# Patient Record
Sex: FEMALE | Race: WHITE | NOT HISPANIC OR LATINO | URBAN - METROPOLITAN AREA
[De-identification: names, ages, dates, MRNs, and addresses within clinical notes are randomized per-mention and may not be internally consistent; named-entity substitution may affect disease eponyms.]

---

## 2023-12-07 ENCOUNTER — COMPREHENSIVE EXAM (OUTPATIENT)
Facility: LOCATION | Age: 83
End: 2023-12-07

## 2023-12-07 DIAGNOSIS — H52.03: ICD-10-CM

## 2023-12-07 DIAGNOSIS — H52.223: ICD-10-CM

## 2023-12-07 DIAGNOSIS — H52.4: ICD-10-CM

## 2023-12-07 PROCEDURE — 92004 COMPRE OPH EXAM NEW PT 1/>: CPT

## 2023-12-07 PROCEDURE — 92015 DETERMINE REFRACTIVE STATE: CPT

## 2023-12-07 ASSESSMENT — VISUAL ACUITY
OU_CC: 20/25
OU_CC: 20/20
OS_CC: 20/20
OS_CC: 20/30
OD_CC: 20/25
OD_CC: 20/25-1

## 2023-12-07 ASSESSMENT — TONOMETRY
OS_IOP_MMHG: 11
OD_IOP_MMHG: 11

## 2024-09-03 ENCOUNTER — CONTACT LENSES/GLASSES VISIT (OUTPATIENT)
Facility: LOCATION | Age: 84
End: 2024-09-03

## 2024-09-03 DIAGNOSIS — Z96.1: ICD-10-CM

## 2024-09-03 DIAGNOSIS — H04.123: ICD-10-CM

## 2024-09-03 DIAGNOSIS — H18.513: ICD-10-CM

## 2024-09-03 DIAGNOSIS — H53.8: ICD-10-CM

## 2024-09-03 PROCEDURE — 99213 OFFICE O/P EST LOW 20 MIN: CPT

## 2024-09-27 ENCOUNTER — HOSPITAL ENCOUNTER (OUTPATIENT)
Facility: HOSPITAL | Age: 84
Setting detail: RECURRING SERIES
Discharge: HOME | End: 2024-09-30
Payer: MEDICARE

## 2024-09-27 PROCEDURE — 97162 PT EVAL MOD COMPLEX 30 MIN: CPT

## 2024-09-27 NOTE — THERAPY EVALUATION
[facility]  [address], [address], [place] [address] Ph:[phone] Fx: [phone]  Plan of Care / Statement of Necessity for Physical Therapy Services     Patient Name: Sylvie Leblanc : 1940   Medical   Diagnosis: Pain in left knee [M25.562] Treatment Diagnosis: M25.562  LEFT KNEE PAIN        Onset Date: DOS: 24     Referral Source: Martinez Hinkle MD Start of Care (SOC): 2024   Prior Hospitalization: See medical history Provider #: [id number]   Prior Level of Function: Chronic L knee pain with ambulation and activities   Comorbidities: Musculoskeletal disorders, Social determinants of health: Depression, and Other: scoliosis, arthritis, latex allergy     Assessment / key information: Sylvie Leblanc is a 84 y.o. female with Dx: L knee pain s/p L TKA on 24. Notes pain for at least 2.5 years prior to the surgery. Reports she has HH for 2 weeks and was DC. Using a walker for ambulation but would like to walk independently. Notes difficulty with prolonged walking, stairs, and transfers. Is taking oxycodone sometimes at night if the pain is really bad, but tries to take tylenol throughout the day. Reports 2 falls in the past year. Pt lives with her son off and on in a 2 story house, however pt stays downstairs; does have 3 ALLY. Notes R knee pain as well, but isn't interested in another surgery yet. Pt rates pain as 10/10 max, 3/10 at best, 5/10 currently.    Objective:   Gait: fwd trunk with 2WW, lack of L knee motion and decreased stance time on L.   Palpation TTP with patellar mobs and popliteal space.   Knee ROM Flexion R 126 L 86, Extension R 0 L 0.   Strength: Hip Flexion R 4/5 L 4/5, ABD R 3-/5 L 2/5, Extension R NT L NT, Knee Flexion R 4+/5 L 3/5, Extension R 4+/5 L 4-/5.   Current deficits include decreased ROM and strength in L knee affecting gait, stair negotiation and transfers secondary to L TKA.  Pt will benefit from a

## 2024-09-27 NOTE — PROGRESS NOTES
PHYSICAL / OCCUPATIONAL THERAPY - DAILY TREATMENT NOTE (updated )  For Eval visit    Patient Name: Sylvie Leblanc    Date: 2024    : 1940  Insurance: Payor: JILLIAN MEDICARE / [facility] / Product Type: *No Product type* /      Patient  verified yes     Visit #   Current / Total 1 24   Time   In / Out 10:30 11:00   Pain   In / Out 5 7   Subjective Functional Status/Changes: See POC     TREATMENT AREA =  Pain in left knee [M25.562]    OBJECTIVE           30 min   Eval - untimed                      Therapeutic Procedures:  Tx Min Billable or 1:1 Min (if diff from Tx Min) Procedure, Rationale, Specifics     55233 Therapeutic Exercise (timed):  increase ROM, strength, coordination, balance, and proprioception to improve patient's ability to progress to PLOF and address remaining functional goals. (see flow sheet as applicable)     Details if applicable:       80718 Neuromuscular Re-Education (timed):  improve balance, coordination, kinesthetic sense, posture, core stability and proprioception to improve patient's ability to develop conscious control of individual muscles and awareness of position of extremities in order to progress to PLOF and address remaining functional goals. (see flow sheet as applicable)     Details if applicable:       31857 Manual Therapy (timed):  decrease pain, increase ROM, increase tissue extensibility, and decrease trigger points to improve patient's ability to progress to PLOF and address remaining functional goals.  The manual therapy interventions were performed at a separate and distinct time from the therapeutic activities interventions . (see flow sheet as applicable)     Details if applicable:       91196 Self Care/Home Management (timed):  improve patient knowledge and understanding of pain reducing techniques, positioning, posture/ergonomics, home safety, activity modification, diagnosis/prognosis, and physical therapy expectations,

## 2024-09-30 ENCOUNTER — HOSPITAL ENCOUNTER (OUTPATIENT)
Facility: HOSPITAL | Age: 84
Setting detail: RECURRING SERIES
Discharge: HOME | End: 2024-10-03
Payer: MEDICARE

## 2024-09-30 PROCEDURE — 97140 MANUAL THERAPY 1/> REGIONS: CPT

## 2024-09-30 PROCEDURE — 97110 THERAPEUTIC EXERCISES: CPT

## 2024-09-30 PROCEDURE — 97112 NEUROMUSCULAR REEDUCATION: CPT

## 2024-09-30 NOTE — PROGRESS NOTES
PHYSICAL / OCCUPATIONAL THERAPY - DAILY TREATMENT NOTE (updated )    Patient Name: Sylvie Leblanc    Date: 2024    : 1940  Insurance: Payor: JILLIAN MEDICARE / Plan: JILLIAN MEDICARE VALUE HMO / Product Type: *No Product type* /      Patient  verified Yes     Visit #   Current / Total 2 24   Time   In / Out 11:40 12:30   Pain   In / Out 5/10 5/10   Subjective Functional Status/Changes: Been doing her HEP. Wondering is she is doing well this far out from surgery.      TREATMENT AREA =  Pain in left knee [M25.562]    OBJECTIVE    Modalities Rationale:     decrease edema, decrease inflammation, and decrease pain to improve patient's ability to progress to PLOF and address remaining functional goals.     min [] Estim Unattended, type/location:                                      []  w/ice    []  w/heat    min [] Estim Attended, type/location:                                     []  w/US     []  w/ice    []  w/heat    []  TENS insruct      min []  Mechanical Traction: type/lbs                   []  pro   []  sup   []  int   []  cont    []  before manual    []  after manual    min []  Ultrasound, settings/location:     10 min  unbill [x]  Ice     []  Heat    location/position: Supine  L knee elevated with leg over wedge pillow.    min []  Paraffin,  details:     min []  Vasopneumatic Device, press/temp:     min []  Whirlpool / Fluido:    If using vaso (only need to measure limb vaso being performed on)      pre-treatment girth :       post-treatment girth :       measured at (landmark location) :      min []  Other:    Skin assessment post-treatment:   Intact      Therapeutic Procedures:  Tx Min Billable or 1:1 Min (if diff from Tx Min) Procedure, Rationale, Specifics   15  01377 Therapeutic Exercise (timed):  increase ROM, strength, coordination, balance, and proprioception to improve patient's ability to progress to PLOF and address remaining functional goals. (see flow sheet as applicable)

## 2024-10-02 ENCOUNTER — HOSPITAL ENCOUNTER (OUTPATIENT)
Facility: HOSPITAL | Age: 84
Setting detail: RECURRING SERIES
Discharge: HOME | End: 2024-10-05
Payer: MEDICARE

## 2024-10-02 ENCOUNTER — APPOINTMENT (OUTPATIENT)
Facility: HOSPITAL | Age: 84
End: 2024-10-02
Payer: MEDICARE

## 2024-10-02 PROCEDURE — 97530 THERAPEUTIC ACTIVITIES: CPT

## 2024-10-02 PROCEDURE — 97110 THERAPEUTIC EXERCISES: CPT

## 2024-10-02 PROCEDURE — 97140 MANUAL THERAPY 1/> REGIONS: CPT

## 2024-10-02 PROCEDURE — 97116 GAIT TRAINING THERAPY: CPT

## 2024-10-02 NOTE — PROGRESS NOTES
PHYSICAL / OCCUPATIONAL THERAPY - DAILY TREATMENT NOTE (updated )    Patient Name: Sylvie Leblanc    Date: 10/2/2024    : 1940  Insurance: Payor: JILLIAN MEDICARE / [facility] / Product Type: *No Product type* /      Patient  verified Yes     Visit #   Current / Total 3 24   Time   In / Out 12:25 1:28   Pain   In / Out 6/10  6-7/10   Subjective Functional Status/Changes: Doing the exercises. Able to lift the leg to get into the shower today.      TREATMENT AREA =  Pain in left knee [M25.562]    OBJECTIVE    Modalities Rationale:     decrease edema, decrease inflammation, and decrease pain to improve patient's ability to progress to PLOF and address remaining functional goals.     min [] Estim Unattended, type/location:                                      []  w/ice    []  w/heat    min [] Estim Attended, type/location:                                     []  w/US     []  w/ice    []  w/heat    []  TENS insruct      min []  Mechanical Traction: type/lbs                   []  pro   []  sup   []  int   []  cont    []  before manual    []  after manual    min []  Ultrasound, settings/location:     10 min  unbill [x]  Ice     []  Heat    location/position: Supine  L knee elevated 30 deg with leg over wedge pillow.    min []  Paraffin,  details:     min []  Vasopneumatic Device, press/temp:     min []  Whirlpool / Fluido:    If using vaso (only need to measure limb vaso being performed on)      pre-treatment girth :       post-treatment girth :       measured at (landmark location) :      min []  Other:    Skin assessment post-treatment:   Intact      Therapeutic Procedures:  Tx Min Billable or 1:1 Min (if diff from Tx Min) Procedure, Rationale, Specifics   23  89259 Therapeutic Exercise (timed):  increase ROM, strength, coordination, balance, and proprioception to improve patient's ability to progress to PLOF and address remaining functional goals. (see flow sheet as applicable)

## 2024-10-04 ENCOUNTER — HOSPITAL ENCOUNTER (OUTPATIENT)
Facility: HOSPITAL | Age: 84
Setting detail: RECURRING SERIES
Discharge: HOME | End: 2024-10-07
Payer: MEDICARE

## 2024-10-04 ENCOUNTER — APPOINTMENT (OUTPATIENT)
Facility: HOSPITAL | Age: 84
End: 2024-10-04
Payer: MEDICARE

## 2024-10-04 PROCEDURE — 97112 NEUROMUSCULAR REEDUCATION: CPT

## 2024-10-04 PROCEDURE — 97140 MANUAL THERAPY 1/> REGIONS: CPT

## 2024-10-07 ENCOUNTER — HOSPITAL ENCOUNTER (OUTPATIENT)
Facility: HOSPITAL | Age: 84
Setting detail: RECURRING SERIES
Discharge: HOME | End: 2024-10-10
Payer: MEDICARE

## 2024-10-07 PROCEDURE — 97140 MANUAL THERAPY 1/> REGIONS: CPT

## 2024-10-07 PROCEDURE — 97116 GAIT TRAINING THERAPY: CPT

## 2024-10-07 PROCEDURE — 97110 THERAPEUTIC EXERCISES: CPT

## 2024-10-07 NOTE — PROGRESS NOTES
treatment of this patient, the contents of this document represent the material reviewed with the patient via the .     Future Appointments   Date Time Provider Department Center   10/7/2024 11:40 AM Oneyda Smart PT [facility]   10/9/2024 10:20 AM Oneyda Smart PT [facility]   10/11/2024 11:00 AM Oneyda Smart PT [facility]   10/14/2024 11:00 AM Marcelo Rae PT [facility]   10/16/2024 11:40 AM Marcelo Rae PT [facility]   10/21/2024  1:00 PM Marcelo Rae PT [facility]   10/23/2024 11:40 AM Marcelo Rae, PT [facility]

## 2024-10-09 ENCOUNTER — HOSPITAL ENCOUNTER (OUTPATIENT)
Facility: HOSPITAL | Age: 84
Setting detail: RECURRING SERIES
End: 2024-10-09
Payer: MEDICARE

## 2024-10-10 ENCOUNTER — TELEPHONE (OUTPATIENT)
Facility: HOSPITAL | Age: 84
End: 2024-10-10

## 2024-10-10 NOTE — TELEPHONE ENCOUNTER
Returned pt call about RIGHT leg pain. Pt notes she can't bend or stand on it and asks if she should come to PT tomorrow. Pt has called MD who told her to ice and elevate. Adivsed pt to come to apt tomorrow so we can see it and give her further direction.

## 2024-10-11 ENCOUNTER — HOSPITAL ENCOUNTER (OUTPATIENT)
Facility: HOSPITAL | Age: 84
Setting detail: RECURRING SERIES
Discharge: HOME | End: 2024-10-14
Payer: MEDICARE

## 2024-10-11 PROCEDURE — 97112 NEUROMUSCULAR REEDUCATION: CPT

## 2024-10-11 PROCEDURE — 97140 MANUAL THERAPY 1/> REGIONS: CPT

## 2024-10-11 PROCEDURE — 97110 THERAPEUTIC EXERCISES: CPT

## 2024-10-11 NOTE — PROGRESS NOTES
PHYSICAL / OCCUPATIONAL THERAPY - DAILY TREATMENT NOTE (updated )    Patient Name: Sylvie Leblanc    Date: 10/11/2024    : 1940  Insurance: Payor: JILLIAN MEDICARE / Plan: JILLIAN MEDICARE VALUE HMO / Product Type: *No Product type* /      Patient  verified Yes     Visit #   Current / Total 6 24   Time   In / Out 11:00 11:55   Pain   In / Out L 4/10  R 6/10 L 6/10  R 6/10   Subjective Functional Status/Changes: Says her L knee was hurting really bad the next day after last session as expected, but the R knee started to really hurt Wednesday morning after waking up. Not sure what caused it, but it was so excruciating.     TREATMENT AREA =  Pain in left knee [M25.562]    OBJECTIVE    Modalities Rationale:     decrease edema, decrease inflammation, and decrease pain to improve patient's ability to progress to PLOF and address remaining functional goals.   min [] Estim Unattended, type/location:                                      []  w/ice    []  w/heat    min [] Estim Attended, type/location:                                     []  w/US     []  w/ice    []  w/heat    []  TENS insruct      min []  Mechanical Traction: type/lbs                   []  pro   []  sup   []  int   []  cont    []  before manual    []  after manual    min []  Ultrasound, settings/location:     10 min  unbill [x]  Ice     []  Heat    location/position: Supine  L knee elevated 30 deg with leg over wedge pillow.    min []  Paraffin,  details:     min []  Vasopneumatic Device, press/temp:     min []  Whirlpool / Fluido:    If using vaso (only need to measure limb vaso being performed on)      pre-treatment girth :       post-treatment girth :       measured at (landmark location) :      min []  Other:    Skin assessment post-treatment:   Intact      Therapeutic Procedures:  Tx Min Billable or 1:1 Min (if diff from Tx Min) Procedure, Rationale, Specifics   20  71651 Therapeutic Exercise (timed):  increase ROM, strength,

## 2024-10-14 ENCOUNTER — HOSPITAL ENCOUNTER (OUTPATIENT)
Facility: HOSPITAL | Age: 84
Setting detail: RECURRING SERIES
Discharge: HOME | End: 2024-10-17
Payer: MEDICARE

## 2024-10-14 PROCEDURE — 97140 MANUAL THERAPY 1/> REGIONS: CPT

## 2024-10-14 PROCEDURE — 97112 NEUROMUSCULAR REEDUCATION: CPT

## 2024-10-14 PROCEDURE — 97110 THERAPEUTIC EXERCISES: CPT

## 2024-10-16 ENCOUNTER — HOSPITAL ENCOUNTER (OUTPATIENT)
Facility: HOSPITAL | Age: 84
Setting detail: RECURRING SERIES
Discharge: HOME | End: 2024-10-19
Payer: MEDICARE

## 2024-10-16 PROCEDURE — 97530 THERAPEUTIC ACTIVITIES: CPT

## 2024-10-16 PROCEDURE — 97116 GAIT TRAINING THERAPY: CPT

## 2024-10-16 PROCEDURE — 97110 THERAPEUTIC EXERCISES: CPT

## 2024-10-16 NOTE — PROGRESS NOTES
then gait training with SPC to end session. Confidence is biggest issue with pt at this point.    Patient will continue to benefit from skilled PT / OT services to modify and progress therapeutic interventions, analyze and address functional mobility deficits, analyze and address ROM deficits, analyze and address strength deficits, analyze and address soft tissue restrictions, analyze and cue for proper movement patterns, analyze and modify for postural abnormalities, analyze and address imbalance/dizziness, and instruct in home and community integration to address functional deficits and attain remaining goals.    Progress toward goals / Updated goals:  []  See Progress Note/Recertification    Short Term Goals: To be accomplished in 4 weeks  Independent with HEP to progress to meet goals. Been trying to do the HEP, 9/30/24  2. Pt to report decreased max pain levels less than 7/10 for improvement in ADLs. Pain staying around 5/10 today, 10/14/24  3. Pt to demonstrate knee ROM 0-115 to improve stair negotiation. L knee ROM  0-112 deg 10/16/24    Long Term Goals: To be accomplished in 8 weeks  Improve LEFS score by 10 to show a significant functional change.  2. Pt to report decreased max pain levels less than 3/10 for improvement in QoL.  3. Pt to demonstrate appropriate gait with SPC for 6min in clinic to improve household ambulation. Able to walk with  feet today with CGA, 10/16/24    All goals remain applicable at this time.     NEXT PN/RC DUE RECERT DATE   10/27/24 11/27/24     Auth Visit Count Auth Expiration Date   NA NA       PLAN  Yes  Continue plan of care  [x]  Upgrade activities as tolerated  []  Discharge due to :  []  Other:    Oneyda Smart, PT    10/16/2024    8:12 AM    If an interpreting service was utilized for treatment of this patient, the contents of this document represent the material reviewed with the patient via the .     Future Appointments   Date Time Provider Department

## 2024-10-21 ENCOUNTER — HOSPITAL ENCOUNTER (OUTPATIENT)
Facility: HOSPITAL | Age: 84
Setting detail: RECURRING SERIES
Discharge: HOME | End: 2024-10-24
Payer: MEDICARE

## 2024-10-21 PROCEDURE — 97530 THERAPEUTIC ACTIVITIES: CPT

## 2024-10-21 PROCEDURE — 97140 MANUAL THERAPY 1/> REGIONS: CPT

## 2024-10-21 PROCEDURE — 97110 THERAPEUTIC EXERCISES: CPT

## 2024-10-21 NOTE — PROGRESS NOTES
[facility]   10/23/2024 11:40 AM Marcelo Rae, PT [facility]   10/28/2024  1:00 PM Oneyda Smart, DRAKE [facility]   10/30/2024 12:20 PM Oneyda Smart, DRAKE [facility]

## 2024-10-22 NOTE — PROGRESS NOTES
assessing activity performance (ex: sit to stand, stair negotiation)  []   assessing balance/control (ex. Slaughter, DGI, SLS)   10 10 43191 Neuromuscular Re-Education (timed):  improve balance, coordination, kinesthetic sense, posture, core stability and proprioception to improve patient's ability to develop conscious control of individual muscles and awareness of position of extremities in order to progress to PLOF and address remaining functional goals. (see flow sheet as applicable)     Details if applicable:    []   assessing strength/ROM  []   assessing activity performance (ex: sit to stand, stair negotiation)  [x]   assessing balance/control (ex. Slaughter, DGI, SLS)   5 5 20649 Manual Therapy (timed):  decrease pain, increase ROM, and increase tissue extensibility to improve patient's ability to progress to PLOF and address remaining functional goals.  The manual therapy interventions were performed at a separate and distinct time from the therapeutic activities interventions . (see flow sheet as applicable)     Details if applicable:    B LE over wedge: DTM quad   10 10  02039 Therapeutic Activity (timed):  use of dynamic activities replicating functional movements to increase ROM, strength, coordination, balance, and proprioception in order to improve patient's ability to progress to PLOF and address remaining functional goals.  (see flow sheet as applicable)      Details if applicable:    []   assessing strength/ROM  [x]   assessing activity performance (ex: sit to stand, stair negotiation)  []   assessing balance/control (ex. Slaughter, DGI, SLS)   50 40 [facility] Totals Reminder: bill using total billable min of TIMED therapeutic procedures (example: do not include dry needle or estim unattended, both untimed codes, in totals to left)  8-22 min = 1 unit; 23-37 min = 2 units; 38-52 min = 3 units; 53-67 min = 4 units; 68-82 min = 5 units   Total Total     [x]  Patient Education billed concurrently with other procedures

## 2024-10-22 NOTE — THERAPY RECERTIFICATION
[facility]  [address], [address],[place] [address] - Ph: [phone]  Fx: [phone]  PHYSICAL THERAPY PROGRESS NOTE  Patient Name: Sylvie Leblanc : 1940   Treatment/Medical Diagnosis: Pain in left knee [M25.562]   Referral Source: Martinez Hinkle MD     Date of Initial Visit: 24 Attended Visits: 10 Missed Visits: 0     SUMMARY OF TREATMENT  Pt has been evaluated/assessed and participated in 10 PT sessions involving skilled therapeutic exercise, functional activity training,?neuromuscular facilitation and coordination training, patient education,?manual therapy interventions including STM and stretching/PROM, modalities including ice, and instruction on HEP in order to improve upon L knee ROM, strength, balance, gait, decreased pain, and return to PLOF.    SUBJECTIVE: was picking up sticks yesterday in the yard. Could feel it by the end of the day.     CURRENT STATUS  Pt is s/p L TKA performed on 24. Pt is 1.5+ months post op at this time  Pt has made good progress in PT. Max pain level at 7/10, average pain level at 5/10, least pain level at 4/10. Pt report 50% improvement since beginning therapy. Pt still using the FWW for stability. Very hesitant about getting away from the FWW. Able to use SPC but sig antalgic gait all over the place, deficits notes in both LE. Encouraged pt she is doing well this far out from surgery. Encouraged mobility based on tolerance and if pain incr then to decr activity, ice and take pain meds PRN. Pt notes having been performing the HEP as prescribed. Pt will continue to benefit from skilled PT to progress exercises and improve upon?functional activities.    OBJECTIVE:   L knee ROM   Flexion 119 deg   Extension 0 deg    QS - moderate contraction   SLR - 5 deg lag  Step up - 6 inch with decr anterior knee over toe with p!  Step down - 4 inch with decr anterior knee over toe with p!  Tandem stance

## 2024-10-23 ENCOUNTER — HOSPITAL ENCOUNTER (OUTPATIENT)
Facility: HOSPITAL | Age: 84
Setting detail: RECURRING SERIES
Discharge: HOME | End: 2024-10-26
Payer: MEDICARE

## 2024-10-23 PROCEDURE — 97110 THERAPEUTIC EXERCISES: CPT

## 2024-10-23 PROCEDURE — 97112 NEUROMUSCULAR REEDUCATION: CPT

## 2024-10-23 PROCEDURE — 97530 THERAPEUTIC ACTIVITIES: CPT

## 2024-10-28 ENCOUNTER — HOSPITAL ENCOUNTER (OUTPATIENT)
Facility: HOSPITAL | Age: 84
Setting detail: RECURRING SERIES
Discharge: HOME | End: 2024-10-31
Payer: MEDICARE

## 2024-10-28 PROCEDURE — 97110 THERAPEUTIC EXERCISES: CPT

## 2024-10-28 PROCEDURE — 97530 THERAPEUTIC ACTIVITIES: CPT

## 2024-10-28 PROCEDURE — 97116 GAIT TRAINING THERAPY: CPT

## 2024-10-28 NOTE — PROGRESS NOTES
PHYSICAL / OCCUPATIONAL THERAPY - DAILY TREATMENT NOTE (updated )    Patient Name: Sylvie Leblanc    Date: 10/28/2024    : 1940  Insurance: Payor: ENEIDAAUBREE MEDICARE / [facility] / Product Type: *No Product type* /      Patient  verified Yes     Visit #   Current / Total 11 24   Time   In / Out 1:00 1:49   Pain   In / Out 410 4/10   Subjective Functional Status/Changes: Says she has been trying to walk with a SPC at home and is getting better at that. Hurting after last weeks apt, so she used ice.     TREATMENT AREA =  Pain in left knee [M25.562]    OBJECTIVE  Modalities Rationale:     decrease edema, decrease inflammation, and decrease pain to improve patient's ability to progress to PLOF and address remaining functional goals.   min [] Estim Unattended, type/location:                                      []  w/ice    []  w/heat    min [] Estim Attended, type/location:                                     []  w/US     []  w/ice    []  w/heat    []  TENS insruct      min []  Mechanical Traction: type/lbs                   []  pro   []  sup   []  int   []  cont    []  before manual    []  after manual    min []  Ultrasound, settings/location:     10 min  unbill [x]  Ice     []  Heat    location/position: Supine  L knee elevated 30 deg with leg over wedge pillow.    min []  Paraffin,  details:     min []  Vasopneumatic Device, press/temp:     min []  Whirlpool / Fluido:    If using vaso (only need to measure limb vaso being performed on)      pre-treatment girth :       post-treatment girth :       measured at (landmark location) :      min []  Other:    Skin assessment post-treatment:   Intact      Therapeutic Procedures:  Tx Min Billable or 1:1 Min (if diff from Tx Min) Procedure, Rationale, Specifics   15  39270 Therapeutic Exercise (timed):  increase ROM, strength, coordination, balance, and proprioception to improve patient's ability to progress to PLOF and address remaining

## 2024-10-30 ENCOUNTER — HOSPITAL ENCOUNTER (OUTPATIENT)
Facility: HOSPITAL | Age: 84
Setting detail: RECURRING SERIES
Discharge: HOME | End: 2024-11-02
Payer: MEDICARE

## 2024-10-30 PROCEDURE — 97530 THERAPEUTIC ACTIVITIES: CPT

## 2024-10-30 PROCEDURE — 97110 THERAPEUTIC EXERCISES: CPT

## 2024-10-30 NOTE — PROGRESS NOTES
PHYSICAL / OCCUPATIONAL THERAPY - DAILY TREATMENT NOTE (updated )    Patient Name: Sylvie Leblanc    Date: 10/30/2024    : 1940  Insurance: Payor: JILLIAN MEDICARE / [facility] / Product Type: *No Product type* /      Patient  verified Yes     Visit #   Current / Total 12 24   Time   In / Out 12:22 1:10   Pain   In / Out 5/10 5/10   Subjective Functional Status/Changes: Says she is taking her ibuprofen, tramadol, and oxycodone every day. Takes the Tramadol in the morning and oxycodone at night. Reports she isn't taking as much medication as she used to.      TREATMENT AREA =  Pain in left knee [M25.562]    OBJECTIVE  Modalities Rationale:     decrease edema, decrease inflammation, and decrease pain to improve patient's ability to progress to PLOF and address remaining functional goals.   min [] Estim Unattended, type/location:                                      []  w/ice    []  w/heat    min [] Estim Attended, type/location:                                     []  w/US     []  w/ice    []  w/heat    []  TENS insruct      min []  Mechanical Traction: type/lbs                   []  pro   []  sup   []  int   []  cont    []  before manual    []  after manual    min []  Ultrasound, settings/location:     10 min  unbill [x]  Ice     []  Heat    location/position: Supine  L knee elevated 30 deg with leg over wedge pillow.    min []  Paraffin,  details:     min []  Vasopneumatic Device, press/temp:     min []  Whirlpool / Fluido:    If using vaso (only need to measure limb vaso being performed on)      pre-treatment girth :       post-treatment girth :       measured at (landmark location) :      min []  Other:    Skin assessment post-treatment:   Intact      Therapeutic Procedures:  Tx Min Billable or 1:1 Min (if diff from Tx Min) Procedure, Rationale, Specifics   23  40167 Therapeutic Exercise (timed):  increase ROM, strength, coordination, balance, and proprioception to improve

## 2024-11-04 ENCOUNTER — HOSPITAL ENCOUNTER (OUTPATIENT)
Facility: HOSPITAL | Age: 84
Setting detail: RECURRING SERIES
Discharge: HOME | End: 2024-11-07
Payer: MEDICARE

## 2024-11-04 PROCEDURE — 97140 MANUAL THERAPY 1/> REGIONS: CPT

## 2024-11-04 PROCEDURE — 97530 THERAPEUTIC ACTIVITIES: CPT

## 2024-11-04 PROCEDURE — 97110 THERAPEUTIC EXERCISES: CPT

## 2024-11-04 NOTE — PROGRESS NOTES
Objective Information/Functional Measures/Assessment    L knee ROM   0-123 deg     Overall doing well at this time. Good form with STS from chair bed performed sets and reps on elevated plinth to make it not too difficult with excessive compensation via use of body momentum. Added sled push today and fatigued with this      Patient will continue to benefit from skilled PT / OT services to modify and progress therapeutic interventions, analyze and address functional mobility deficits, analyze and address ROM deficits, analyze and address strength deficits, analyze and address soft tissue restrictions, analyze and cue for proper movement patterns, analyze and modify for postural abnormalities, analyze and address imbalance/dizziness, and instruct in home and community integration to address functional deficits and attain remaining goals.    Progress toward goals / Updated goals:  []  See Progress Note/Recertification    Improving in terms of strength for STS transfers to and from chair, progressing to LTG #1    NEXT PN/RC DUE RECERT DATE   11/23/24 11/27/24     Auth Visit Count Auth Expiration Date   24 12/6/24     PLAN  Yes  Continue plan of care  [x]  Upgrade activities as tolerated  []  Discharge due to :  []  Other:    Marcelo Rae PT    11/4/2024    9:22 AM    If an interpreting service was utilized for treatment of this patient, the contents of this document represent the material reviewed with the patient via the .     Future Appointments   Date Time Provider Department Center   11/4/2024 12:20 PM Marcelo Rae PT [facility]   11/6/2024  9:40 AM Oneyda Smart PT [facility]   11/11/2024 12:20 PM Marcelo Rae PT [facility]   11/13/2024  9:40 AM Oneyda Smart PT [facility]   11/18/2024 11:40 AM Oneyda Smart PT [facility]   11/20/2024  9:40 AM Oneyda Smart PT [facility]   11/25/2024  9:40 AM Oneyda Smart PT [facility]   11/27/2024  9:40 AM Marcelo Rae PT [facility]

## 2024-11-06 ENCOUNTER — HOSPITAL ENCOUNTER (OUTPATIENT)
Facility: HOSPITAL | Age: 84
Setting detail: RECURRING SERIES
End: 2024-11-06
Payer: MEDICARE

## 2024-11-11 ENCOUNTER — HOSPITAL ENCOUNTER (OUTPATIENT)
Facility: HOSPITAL | Age: 84
Setting detail: RECURRING SERIES
Discharge: HOME | End: 2024-11-14
Payer: MEDICARE

## 2024-11-11 PROCEDURE — 97530 THERAPEUTIC ACTIVITIES: CPT

## 2024-11-11 PROCEDURE — 97112 NEUROMUSCULAR REEDUCATION: CPT

## 2024-11-11 NOTE — PROGRESS NOTES
PHYSICAL / OCCUPATIONAL THERAPY - DAILY TREATMENT NOTE (updated )    Patient Name: Sylvie Leblanc    Date: 2024    : 1940  Insurance: Payor: JILLIAN [employer]IC[employer] / [facility] / Product Type: *No Product type* /      Patient  verified Yes     Visit #   Current / Total 14 24   Time   In / Out 12:27 1:10   Pain   In / Out 4/10  4/10   Subjective Functional Status/Changes: Pt late by 7 minutes. Agreeable to shortened session     The knee and the back were painful after doing the sled pushes last time. Does not want to do them. The back and knee is better today.      TREATMENT AREA =  Pain in left knee [M25.562]    OBJECTIVE  Modalities Rationale:     decrease edema, decrease inflammation, and decrease pain to improve patient's ability to progress to PLOF and address remaining functional goals.   min [] Estim Unattended, type/location:                                      []  w/ice    []  w/heat    min [] Estim Attended, type/location:                                     []  w/US     []  w/ice    []  w/heat    []  TENS insruct      min []  Mechanical Traction: type/lbs                   []  pro   []  sup   []  int   []  cont    []  before manual    []  after manual    min []  Ultrasound, settings/location:     10 min  unbill [x]  Ice     []  Heat    location/position: Supine  L knee elevated 30 deg with leg over wedge pillow.    min []  Paraffin,  details:     min []  Vasopneumatic Device, press/temp:     min []  Whirlpool / Fluido:    If using vaso (only need to measure limb vaso being performed on)      pre-treatment girth :       post-treatment girth :       measured at (landmark location) :      min []  Other:    Skin assessment post-treatment:   Intact      Therapeutic Procedures:  Tx Min Billable or 1:1 Min (if diff from Tx Min) Procedure, Rationale, Specifics     89262 Therapeutic Exercise (timed):  increase ROM, strength, coordination, balance, and proprioception to

## 2024-11-13 ENCOUNTER — HOSPITAL ENCOUNTER (OUTPATIENT)
Facility: HOSPITAL | Age: 84
Setting detail: RECURRING SERIES
Discharge: HOME | End: 2024-11-16
Payer: MEDICARE

## 2024-11-13 PROCEDURE — 97530 THERAPEUTIC ACTIVITIES: CPT

## 2024-11-13 PROCEDURE — 97110 THERAPEUTIC EXERCISES: CPT

## 2024-11-13 NOTE — PROGRESS NOTES
PHYSICAL / OCCUPATIONAL THERAPY - DAILY TREATMENT NOTE (updated )    Patient Name: Sylvie Leblanc    Date: 2024    : 1940  Insurance: Payor: [employer]IAN [employer] / Plan: J[employer]IAN MEDIC[employer] VALUE HMO / Product Type: *No Product type* /      Patient  verified Yes     Visit #   Current / Total 15 24   Time   In / Out 9:49 10:30   Pain   In / Out 3/10  0/10   Subjective Functional Status/Changes: Says the knee is still sore.      TREATMENT AREA =  Pain in left knee [M25.562]    OBJECTIVE  Modalities Rationale:     decrease edema, decrease inflammation, and decrease pain to improve patient's ability to progress to PLOF and address remaining functional goals.   min [] Estim Unattended, type/location:                                      []  w/ice    []  w/heat    min [] Estim Attended, type/location:                                     []  w/US     []  w/ice    []  w/heat    []  TENS insruct      min []  Mechanical Traction: type/lbs                   []  pro   []  sup   []  int   []  cont    []  before manual    []  after manual    min []  Ultrasound, settings/location:     10 min  unbill [x]  Ice     []  Heat    location/position: Supine  L knee elevated 30 deg with leg over wedge pillow.    min []  Paraffin,  details:     min []  Vasopneumatic Device, press/temp:     min []  Whirlpool / Fluido:    If using vaso (only need to measure limb vaso being performed on)      pre-treatment girth :       post-treatment girth :       measured at (landmark location) :      min []  Other:    Skin assessment post-treatment:   Intact      Therapeutic Procedures:  Tx Min Billable or 1:1 Min (if diff from Tx Min) Procedure, Rationale, Specifics   15  76265 Therapeutic Exercise (timed):  increase ROM, strength, coordination, balance, and proprioception to improve patient's ability to progress to PLOF and address remaining functional goals. (see flow sheet as applicable)     Details if applicable:       46492 Manual

## 2024-11-18 ENCOUNTER — HOSPITAL ENCOUNTER (OUTPATIENT)
Facility: HOSPITAL | Age: 84
Setting detail: RECURRING SERIES
Discharge: HOME | End: 2024-11-21
Payer: MEDICARE

## 2024-11-18 PROCEDURE — 97116 GAIT TRAINING THERAPY: CPT

## 2024-11-18 PROCEDURE — 97110 THERAPEUTIC EXERCISES: CPT

## 2024-11-18 PROCEDURE — 97530 THERAPEUTIC ACTIVITIES: CPT

## 2024-11-18 NOTE — PROGRESS NOTES
PHYSICAL / OCCUPATIONAL THERAPY - DAILY TREATMENT NOTE (updated )    Patient Name: Sylvie Leblanc    Date: 2024    : 1940  Insurance: Payor: JILLIAN MEDICARE / Plan: JILLIAN MEDICARE VALUE HMO / Product Type: *No Product type* /      Patient  verified Yes     Visit #   Current / Total 16 24   Time   In / Out 11:39 12:30   Pain   In / Out 3/10  0/10   Subjective Functional Status/Changes: \"Knee is hurting some.\"     TREATMENT AREA =  Pain in left knee [M25.562]    OBJECTIVE  Modalities Rationale:     decrease edema, decrease inflammation, and decrease pain to improve patient's ability to progress to PLOF and address remaining functional goals.   min [] Estim Unattended, type/location:                                      []  w/ice    []  w/heat    min [] Estim Attended, type/location:                                     []  w/US     []  w/ice    []  w/heat    []  TENS insruct      min []  Mechanical Traction: type/lbs                   []  pro   []  sup   []  int   []  cont    []  before manual    []  after manual    min []  Ultrasound, settings/location:     10 min  unbill [x]  Ice     []  Heat    location/position: Supine  L knee elevated 30 deg with leg over wedge pillow.    min []  Paraffin,  details:     min []  Vasopneumatic Device, press/temp:     min []  Whirlpool / Fluido:    If using vaso (only need to measure limb vaso being performed on)      pre-treatment girth :       post-treatment girth :       measured at (landmark location) :      min []  Other:    Skin assessment post-treatment:   Intact      Therapeutic Procedures:  Tx Min Billable or 1:1 Min (if diff from Tx Min) Procedure, Rationale, Specifics   16  68968 Therapeutic Exercise (timed):  increase ROM, strength, coordination, balance, and proprioception to improve patient's ability to progress to PLOF and address remaining functional goals. (see flow sheet as applicable)     Details if applicable:     8  19843 Gait Training

## 2024-11-20 ENCOUNTER — HOSPITAL ENCOUNTER (OUTPATIENT)
Facility: HOSPITAL | Age: 84
Setting detail: RECURRING SERIES
Discharge: HOME | End: 2024-11-23
Payer: MEDICARE

## 2024-11-20 PROCEDURE — 97116 GAIT TRAINING THERAPY: CPT

## 2024-11-20 PROCEDURE — 97530 THERAPEUTIC ACTIVITIES: CPT

## 2024-11-20 PROCEDURE — 97110 THERAPEUTIC EXERCISES: CPT

## 2024-11-20 NOTE — THERAPY RECERTIFICATION
[facility]  [address] [facility] [address], [place] [address] - Ph: [phone]  Fx: [phone]  PHYSICAL THERAPY PROGRESS NOTE  Patient Name: Sylvie Leblanc : 1940   Treatment/Medical Diagnosis: Pain in left knee [M25.562]   Referral Source: Martinez Hinkle MD     Date of Initial Visit: 24 Attended Visits: 17 Missed Visits: 2     SUMMARY OF TREATMENT  Pt has been evaluated/assessed and participated in 16 PT sessions involving skilled therapeutic exercise, functional activity training,?neuromuscular facilitation and coordination training, patient education,?manual therapy interventions including STM and stretching/PROM, modalities including ice, and instruction on HEP in order to improve upon L knee ROM, strength, balance, gait, decreased pain, and return to PLOF.    CURRENT STATUS  Pt is 10 weeks post op L TKA performed on 24. She has made good progress with skilled PT. Continues to complain of pain, however it is improving and I have tried to assure her that pain is normal after having a TKA. Notes max pain level at 6/10, average pain level at 5/10, least pain level at 0/10. She continues to use 2WW in the community to assist with gait and balance, however has transitioned to SPC or Independent ambulation in the home with good confidence. I advised her to continue using 2WW in community for safety. ROM is doing well and can be seen below. Pt would like to continue to work on gait and hip strength to progress independent ambulation. She is also a  for schools and would like to be able to go back to work in . Pt notes she can sit if needed and use AD as needed. I think she is appropriate to do this job a couple hours a day. Pt notes having been performing the HEP as prescribed. Pt will continue to benefit from skilled PT to progress exercises and improve upon?functional activities.    L knee ROM   Flexion 119 deg

## 2024-11-20 NOTE — PROGRESS NOTES
Updated goals:  [x]  See Progress Note/Recertification      NEXT PN/RC DUE RECERT DATE   12/20/24 11/27/24     Auth Visit Count Auth Expiration Date   24 12/6/24     PLAN  Yes  Continue plan of care  [x]  Upgrade activities as tolerated  []  Discharge due to :  []  Other:    Oneyda Smart PT    11/20/2024    8:09 AM    If an interpreting service was utilized for treatment of this patient, the contents of this document represent the material reviewed with the patient via the .   Future Appointments   Date Time Provider Department Center   11/20/2024  9:40 AM Oneyda Smart PT [facility]   11/25/2024  9:40 AM Oneyda Smart PT [facility]

## 2024-11-25 ENCOUNTER — HOSPITAL ENCOUNTER (OUTPATIENT)
Facility: HOSPITAL | Age: 84
Setting detail: RECURRING SERIES
Discharge: HOME | End: 2024-11-28
Payer: MEDICARE

## 2024-11-25 PROCEDURE — 97530 THERAPEUTIC ACTIVITIES: CPT

## 2024-11-25 PROCEDURE — 97110 THERAPEUTIC EXERCISES: CPT

## 2024-11-25 NOTE — PROGRESS NOTES
PHYSICAL / OCCUPATIONAL THERAPY - DAILY TREATMENT NOTE (updated )    Patient Name: Sylvie Leblanc    Date: 2024    : 1940  Insurance: Payor: JILLIAN MEDICARE / [facility] / Product Type: *No Product type* /      Patient  verified Yes     Visit #   Current / Total 18 24   Time   In / Out 9:40 10:30   Pain   In / Out 4/10  2/10   Subjective Functional Status/Changes: Says the knee is about the same. Hurting more Friday and Saturday.      TREATMENT AREA =  Pain in left knee [M25.562]    OBJECTIVE  Modalities Rationale:     decrease edema, decrease inflammation, and decrease pain to improve patient's ability to progress to PLOF and address remaining functional goals.   min [] Estim Unattended, type/location:                                      []  w/ice    []  w/heat    min [] Estim Attended, type/location:                                     []  w/US     []  w/ice    []  w/heat    []  TENS insruct      min []  Mechanical Traction: type/lbs                   []  pro   []  sup   []  int   []  cont    []  before manual    []  after manual    min []  Ultrasound, settings/location:     10 min  unbill [x]  Ice     []  Heat    location/position: Supine  L knee elevated 30 deg with leg over wedge pillow.    min []  Paraffin,  details:     min []  Vasopneumatic Device, press/temp:     min []  Whirlpool / Fluido:    If using vaso (only need to measure limb vaso being performed on)      pre-treatment girth :       post-treatment girth :       measured at (landmark location) :      min []  Other:    Skin assessment post-treatment:   Intact      Therapeutic Procedures:  Tx Min Billable or 1:1 Min (if diff from Tx Min) Procedure, Rationale, Specifics   15  37043 Therapeutic Exercise (timed):  increase ROM, strength, coordination, balance, and proprioception to improve patient's ability to progress to PLOF and address remaining functional goals. (see flow sheet as applicable)     Details

## 2024-11-27 ENCOUNTER — APPOINTMENT (OUTPATIENT)
Facility: HOSPITAL | Age: 84
End: 2024-11-27
Payer: MEDICARE

## 2024-12-03 ENCOUNTER — APPOINTMENT (OUTPATIENT)
Facility: HOSPITAL | Age: 84
End: 2024-12-03
Payer: MEDICARE

## 2024-12-04 ENCOUNTER — HOSPITAL ENCOUNTER (OUTPATIENT)
Facility: HOSPITAL | Age: 84
Setting detail: RECURRING SERIES
Discharge: HOME | End: 2024-12-07
Payer: MEDICARE

## 2024-12-04 PROCEDURE — 97530 THERAPEUTIC ACTIVITIES: CPT

## 2024-12-04 PROCEDURE — 97116 GAIT TRAINING THERAPY: CPT

## 2024-12-04 PROCEDURE — 97110 THERAPEUTIC EXERCISES: CPT

## 2024-12-04 NOTE — PROGRESS NOTES
PHYSICAL / OCCUPATIONAL THERAPY - DAILY TREATMENT NOTE (updated )    Patient Name: Sylvie Leblanc    Date: 2024    : 1940  Insurance: Payor: JILLIAN MEDICARE / Plan: JILLIAN MEDICARE VALUE HMO / Product Type: *No Product type* /      Patient  verified Yes     Visit #   Current / Total 19 24   Time   In / Out 12:20 12:58   Pain   In / Out 2/10  2/10   Subjective Functional Status/Changes: Says she isn't moving any furniture today. Was in pain after doing that and isn't going to do it again. Says she was in a lot of pain Saturday from the sled push; also says she walked all day on Saturday doing some shopping.      TREATMENT AREA =  Pain in left knee [M25.562]    OBJECTIVE  Modalities Rationale:     decrease edema, decrease inflammation, and decrease pain to improve patient's ability to progress to PLOF and address remaining functional goals.   min [] Estim Unattended, type/location:                                      []  w/ice    []  w/heat    min [] Estim Attended, type/location:                                     []  w/US     []  w/ice    []  w/heat    []  TENS insruct      min []  Mechanical Traction: type/lbs                   []  pro   []  sup   []  int   []  cont    []  before manual    []  after manual    min []  Ultrasound, settings/location:      min  unbill [x]  Ice     []  Heat    location/position: Supine  L knee elevated 30 deg with leg over wedge pillow.    min []  Paraffin,  details:     min []  Vasopneumatic Device, press/temp:     min []  Whirlpool / Fluido:    If using vaso (only need to measure limb vaso being performed on)      pre-treatment girth :       post-treatment girth :       measured at (landmark location) :      min []  Other:    Skin assessment post-treatment:   Intact      Therapeutic Procedures:  Tx Min Billable or 1:1 Min (if diff from Tx Min) Procedure, Rationale, Specifics   15  55930 Therapeutic Exercise (timed):  increase ROM, strength, coordination,

## 2024-12-09 ENCOUNTER — HOSPITAL ENCOUNTER (OUTPATIENT)
Facility: HOSPITAL | Age: 84
Setting detail: RECURRING SERIES
Discharge: HOME | End: 2024-12-12
Payer: MEDICARE

## 2024-12-09 PROCEDURE — 97530 THERAPEUTIC ACTIVITIES: CPT

## 2024-12-09 PROCEDURE — 97110 THERAPEUTIC EXERCISES: CPT

## 2024-12-09 NOTE — PROGRESS NOTES
PHYSICAL / OCCUPATIONAL THERAPY - DAILY TREATMENT NOTE (updated )    Patient Name: Sylvie Leblanc    Date: 2024    : 1940  Insurance: Payor: [employer] MEDICARE / Plan: [employer] MEDICARE VALUE HMO / Product Type: *No Product type* /      Patient  verified Yes     Visit #   Current / Total 20 24   Time   In / Out 9:42 10:30   Pain   In / Out 3/10  2/10   Subjective Functional Status/Changes: Says she went [employer] on Saturday and worked in the yard yesterday for the first time in 2.5 years. Says the knee was hurting last night, but that was because of all the activity she was doing she says.      TREATMENT AREA =  Pain in left knee [M25.562]    OBJECTIVE  Modalities Rationale:     decrease edema, decrease inflammation, and decrease pain to improve patient's ability to progress to PLOF and address remaining functional goals.   min [] Estim Unattended, type/location:                                      []  w/ice    []  w/heat    min [] Estim Attended, type/location:                                     []  w/US     []  w/ice    []  w/heat    []  TENS insruct      min []  Mechanical Traction: type/lbs                   []  pro   []  sup   []  int   []  cont    []  before manual    []  after manual    min []  Ultrasound, settings/location:     10 min  unbill [x]  Ice     []  Heat    location/position: Supine  L knee elevated 30 deg with leg over wedge pillow.    min []  Paraffin,  details:     min []  Vasopneumatic Device, press/temp:     min []  Whirlpool / Fluido:    If using vaso (only need to measure limb vaso being performed on)      pre-treatment girth :       post-treatment girth :       measured at (landmark location) :      min []  Other:    Skin assessment post-treatment:   Intact      Therapeutic Procedures:  Tx Min Billable or 1:1 Min (if diff from Tx Min) Procedure, Rationale, Specifics   23  91062 Therapeutic Exercise (timed):  increase ROM, strength, coordination, balance, and

## 2024-12-16 ENCOUNTER — HOSPITAL ENCOUNTER (OUTPATIENT)
Facility: HOSPITAL | Age: 84
Setting detail: RECURRING SERIES
Discharge: HOME | End: 2024-12-19
Payer: MEDICARE

## 2024-12-16 PROCEDURE — 97110 THERAPEUTIC EXERCISES: CPT

## 2024-12-16 PROCEDURE — 97530 THERAPEUTIC ACTIVITIES: CPT

## 2024-12-16 PROCEDURE — 97116 GAIT TRAINING THERAPY: CPT

## 2024-12-16 NOTE — THERAPY DISCHARGE
[facility]  [address] [facility] [address] [facility] [address],[place] [address] - Ph: [phone]  Fx: [phone]  PHYSICAL THERAPY DISCHARGE NOTE  Patient Name: Sylvie Leblanc : 1940   Treatment/Medical Diagnosis: Pain in left knee [M25.562]   Referral Source: Martinez Hinkle MD     Date of Initial Visit: 24 Attended Visits: 21 Missed Visits: 2     SUMMARY OF TREATMENT  Pt has been evaluated/assessed and participated in 20 PT sessions involving skilled therapeutic exercise, functional activity training,?neuromuscular facilitation and coordination training, patient education,?manual therapy interventions including STM and stretching/PROM, modalities including ice, and instruction on HEP in order to improve upon L knee ROM, strength, balance, gait, decreased pain, and return to PLOF.    CURRENT STATUS  Pt is 14 weeks post op L TKA performed on 24. She has made good progress with skilled PT, however continues to complain of pain. Notes max pain level at 6/10, average pain level at 5/10, least pain level at 0/10. Pt uses 2WW for ambulating safely in the community which I encouraged her to continue with, however can walk independently at home with good confidence. Trendelenburg gait still present, but this is chronic. She is planning to return as a  for schools in . Pt notes she can sit if needed and use AD as needed, therefore, I think she is appropriate to do this job a couple hours a day. Pt notes having been performing the HEP as prescribed. Pt will be DC at this time due to maximizing progress with skilled PT.    L knee ROM   Flexion 121 deg   Extension 1 deg of hyperextension    PROGRESS TO LONG TERM GOALS:  1. Improve LEFS score by 10 to show a significant functional change   Status at IE: 36/80   Current Status: 50/80  Goal Met?  Yes    3. Pt to report decreased max pain levels less than 3/10 for improvement in QoL.   Status at last  Never

## 2024-12-16 NOTE — PROGRESS NOTES
[facility]  [facility]  [address] [address]   Ph: [phone] Fx: [phone]    DISCHARGE INSTRUCTIONS    Patient: Sylvie Leblanc  : 1940  Date: 2024    Reason for discharge from physical therapy:    [x] Met or Progressing to all set goals    [] Minimal Progress made to set goals    [] Plateau in improvement    [] Insurance/financial factors    [] Other: ______________________    Instructions for home    Continue Home Exercise Program 3-5 times per week as indicated on your handout      Continue with    [x] Ice    [] Heat   as needed for 10-15 minutes                                                             Follow up with MD:      [] Upon completion of therapy                           [x] As needed     If you have severe/emergency pain call  or seek immediate medical attention      Information Regarding Direct Access to physical therapy  [place] law now allows for a patient to see a physical therapist directly (without having first seen a physician) for an evaluation and treatment. Certain rules apply and not all insurances participate. If you are interested in seeing a physical therapist directly for your injury/condition please contact us or visit [facility]      We value your opinion! Please leave us a Google review by searching \"[facility]\" then select reviews.      Oneyda Smart PT 2024 11:30 AM      
sheet as applicable)     Details if applicable:     8  61339 Gait Training (timed):    925 feet with no (assistive device) over even surfaces with SBA level of assist. Cuing for step through pattern, keeping trunk upright and hips level.  To improve safety and dynamic movement with household/community ambulation.  (see flow sheet as applicable)      Details if applicable:  925 feet in 6 min without AD; gait to and from plint, NuStep, and // bars with cueing for upright posture and heel toe gait patterning.   10   66016 Therapeutic Activity (timed):  use of dynamic activities replicating functional movements to increase ROM, strength, coordination, balance, and proprioception in order to improve patient's ability to progress to PLOF and address remaining functional goals.  (see flow sheet as applicable)      Details if applicable:    38  MC BC Totals Reminder: bill using total billable min of TIMED therapeutic procedures (example: do not include dry needle or estim unattended, both untimed codes, in totals to left)  8-22 min = 1 unit; 23-37 min = 2 units; 38-52 min = 3 units; 53-67 min = 4 units; 68-82 min = 5 units   Total Total     [x]  Patient Education billed concurrently with other procedures   [x] Review HEP    [] Progressed/Changed HEP, detail:    [] Other detail:       Objective Information/Functional Measures/Assessment    See DC    Patient will continue to benefit from skilled PT / OT services to modify and progress therapeutic interventions, analyze and address functional mobility deficits, analyze and address ROM deficits, analyze and address strength deficits, analyze and address soft tissue restrictions, analyze and cue for proper movement patterns, analyze and modify for postural abnormalities, analyze and address imbalance/dizziness, and instruct in home and community integration to address functional deficits and attain remaining goals.    Progress toward goals / Updated goals:  [x]  See DC    See

## 2024-12-30 ENCOUNTER — APPOINTMENT (OUTPATIENT)
Facility: HOSPITAL | Age: 84
End: 2024-12-30
Payer: MEDICARE